# Patient Record
Sex: FEMALE | Race: WHITE | Employment: FULL TIME | URBAN - METROPOLITAN AREA
[De-identification: names, ages, dates, MRNs, and addresses within clinical notes are randomized per-mention and may not be internally consistent; named-entity substitution may affect disease eponyms.]

---

## 2017-12-01 PROBLEM — Z00.00 HEALTH CARE MAINTENANCE: Status: ACTIVE | Noted: 2017-12-01

## 2017-12-01 PROBLEM — E78.5 HYPERLIPIDEMIA: Status: ACTIVE | Noted: 2017-12-01

## 2017-12-01 PROBLEM — R01.1 MURMUR, CARDIAC: Status: ACTIVE | Noted: 2017-12-01

## 2017-12-01 PROBLEM — R00.2 PALPITATIONS: Status: ACTIVE | Noted: 2017-12-01

## 2018-03-06 PROBLEM — R00.0 TACHYCARDIA: Status: ACTIVE | Noted: 2018-03-06

## 2018-04-12 PROBLEM — Z00.00 HEALTH CARE MAINTENANCE: Status: RESOLVED | Noted: 2017-12-01 | Resolved: 2018-04-12

## 2019-04-30 PROBLEM — I34.1 MVP (MITRAL VALVE PROLAPSE): Status: ACTIVE | Noted: 2019-04-30

## 2019-04-30 PROBLEM — I34.0 NON-RHEUMATIC MITRAL REGURGITATION: Status: ACTIVE | Noted: 2019-04-30

## 2023-04-18 PROBLEM — B37.31 CANDIDIASIS OF VAGINA: Status: ACTIVE | Noted: 2022-04-07

## 2023-04-18 PROBLEM — G44.229 CHRONIC TENSION-TYPE HEADACHE: Status: ACTIVE | Noted: 2022-04-07

## 2023-04-18 PROBLEM — J00 NASOPHARYNGITIS: Status: ACTIVE | Noted: 2023-03-22

## 2023-04-18 PROBLEM — J30.9 ALLERGIC RHINITIS: Status: ACTIVE | Noted: 2023-03-22

## 2023-07-14 ENCOUNTER — ANESTHESIA EVENT (OUTPATIENT)
Dept: OPERATING ROOM | Age: 48
End: 2023-07-14
Payer: COMMERCIAL

## 2023-07-14 RX ORDER — SODIUM CHLORIDE 9 MG/ML
INJECTION, SOLUTION INTRAVENOUS PRN
Status: CANCELLED | OUTPATIENT
Start: 2023-07-14

## 2023-07-17 ENCOUNTER — ANESTHESIA (OUTPATIENT)
Dept: OPERATING ROOM | Age: 48
End: 2023-07-17
Payer: COMMERCIAL

## 2023-07-17 ENCOUNTER — HOSPITAL ENCOUNTER (OUTPATIENT)
Age: 48
Setting detail: OUTPATIENT SURGERY
Discharge: HOME | End: 2023-07-17
Attending: SURGERY | Admitting: SURGERY
Payer: COMMERCIAL

## 2023-07-17 VITALS
OXYGEN SATURATION: 100 % | BODY MASS INDEX: 20.34 KG/M2 | TEMPERATURE: 97.2 F | RESPIRATION RATE: 17 BRPM | DIASTOLIC BLOOD PRESSURE: 69 MMHG | HEART RATE: 73 BPM | WEIGHT: 134.2 LBS | HEIGHT: 68 IN | SYSTOLIC BLOOD PRESSURE: 100 MMHG

## 2023-07-17 LAB — HCG, PREGNANCY URINE (POC): NEGATIVE

## 2023-07-17 PROCEDURE — 2709999900 HC NON-CHARGEABLE SUPPLY: Performed by: SURGERY

## 2023-07-17 PROCEDURE — 6370000000 HC RX 637 (ALT 250 FOR IP): Performed by: SURGERY

## 2023-07-17 PROCEDURE — 2580000003 HC RX 258: Performed by: ANESTHESIOLOGY

## 2023-07-17 PROCEDURE — 2500000003 HC RX 250 WO HCPCS

## 2023-07-17 PROCEDURE — 81025 URINE PREGNANCY TEST: CPT

## 2023-07-17 PROCEDURE — 7100000010 HC PHASE II RECOVERY - FIRST 15 MIN: Performed by: SURGERY

## 2023-07-17 PROCEDURE — 6360000002 HC RX W HCPCS

## 2023-07-17 PROCEDURE — 7100000011 HC PHASE II RECOVERY - ADDTL 15 MIN: Performed by: SURGERY

## 2023-07-17 PROCEDURE — 3700000000 HC ANESTHESIA ATTENDED CARE: Performed by: SURGERY

## 2023-07-17 PROCEDURE — 3700000001 HC ADD 15 MINUTES (ANESTHESIA): Performed by: SURGERY

## 2023-07-17 PROCEDURE — 3609027000 HC COLONOSCOPY: Performed by: SURGERY

## 2023-07-17 RX ORDER — PROPOFOL 10 MG/ML
INJECTION, EMULSION INTRAVENOUS PRN
Status: DISCONTINUED | OUTPATIENT
Start: 2023-07-17 | End: 2023-07-17 | Stop reason: SDUPTHER

## 2023-07-17 RX ORDER — SODIUM CHLORIDE 0.9 % (FLUSH) 0.9 %
5-40 SYRINGE (ML) INJECTION EVERY 12 HOURS SCHEDULED
Status: DISCONTINUED | OUTPATIENT
Start: 2023-07-17 | End: 2023-07-17 | Stop reason: HOSPADM

## 2023-07-17 RX ORDER — PHENYLEPHRINE HCL IN 0.9% NACL 1 MG/10 ML
SYRINGE (ML) INTRAVENOUS PRN
Status: DISCONTINUED | OUTPATIENT
Start: 2023-07-17 | End: 2023-07-17 | Stop reason: SDUPTHER

## 2023-07-17 RX ORDER — SODIUM CHLORIDE 0.9 % (FLUSH) 0.9 %
5-40 SYRINGE (ML) INJECTION PRN
Status: DISCONTINUED | OUTPATIENT
Start: 2023-07-17 | End: 2023-07-17 | Stop reason: HOSPADM

## 2023-07-17 RX ORDER — LIDOCAINE HYDROCHLORIDE 10 MG/ML
INJECTION, SOLUTION INFILTRATION; PERINEURAL PRN
Status: DISCONTINUED | OUTPATIENT
Start: 2023-07-17 | End: 2023-07-17 | Stop reason: SDUPTHER

## 2023-07-17 RX ORDER — SODIUM CHLORIDE, SODIUM LACTATE, POTASSIUM CHLORIDE, CALCIUM CHLORIDE 600; 310; 30; 20 MG/100ML; MG/100ML; MG/100ML; MG/100ML
INJECTION, SOLUTION INTRAVENOUS CONTINUOUS
Status: DISCONTINUED | OUTPATIENT
Start: 2023-07-17 | End: 2023-07-17 | Stop reason: HOSPADM

## 2023-07-17 RX ORDER — SIMETHICONE 20 MG/.3ML
EMULSION ORAL PRN
Status: DISCONTINUED | OUTPATIENT
Start: 2023-07-17 | End: 2023-07-17 | Stop reason: ALTCHOICE

## 2023-07-17 RX ADMIN — PROPOFOL 50 MG: 10 INJECTION, EMULSION INTRAVENOUS at 09:25

## 2023-07-17 RX ADMIN — LIDOCAINE HYDROCHLORIDE 40 MG: 10 INJECTION, SOLUTION INFILTRATION; PERINEURAL at 09:15

## 2023-07-17 RX ADMIN — PROPOFOL 80 MG: 10 INJECTION, EMULSION INTRAVENOUS at 09:20

## 2023-07-17 RX ADMIN — Medication 100 MCG: at 09:26

## 2023-07-17 RX ADMIN — PROPOFOL 50 MG: 10 INJECTION, EMULSION INTRAVENOUS at 09:35

## 2023-07-17 RX ADMIN — PROPOFOL 70 MG: 10 INJECTION, EMULSION INTRAVENOUS at 09:16

## 2023-07-17 RX ADMIN — SODIUM CHLORIDE, POTASSIUM CHLORIDE, SODIUM LACTATE AND CALCIUM CHLORIDE: 600; 310; 30; 20 INJECTION, SOLUTION INTRAVENOUS at 08:30

## 2023-07-17 RX ADMIN — PROPOFOL 175 MCG/KG/MIN: 10 INJECTION, EMULSION INTRAVENOUS at 09:18

## 2023-07-17 RX ADMIN — PROPOFOL 150 MCG/KG/MIN: 10 INJECTION, EMULSION INTRAVENOUS at 09:15

## 2023-07-17 ASSESSMENT — PAIN - FUNCTIONAL ASSESSMENT: PAIN_FUNCTIONAL_ASSESSMENT: NONE - DENIES PAIN

## 2023-07-17 NOTE — ANESTHESIA POSTPROCEDURE EVALUATION
Department of Anesthesiology  Postprocedure Note    Patient: Jacob Akers  MRN: [id number]  [address] [facility] 1975  Date of evaluation: 7/17/2023      Procedure Summary     Date: 07/17/23 Room / Location: [address]    Anesthesia Start: 9003 Anesthesia Stop: 8057    Procedure: COLORECTAL CANCER SCREENING, NOT HIGH RISK (Anus) Diagnosis:       Screening for colon cancer      (Screening for colon cancer [Z12.11])    Surgeons: Thuan Mejia DO Responsible Provider: Barry Scott MD    Anesthesia Type: general, MAC ASA Status: 1          Anesthesia Type: No value filed.     Brie Phase I: Brie Score: 10    Brie Phase II: Brie Score: 10      Anesthesia Post Evaluation    Patient location during evaluation: bedside  Patient participation: complete - patient participated  Level of consciousness: awake  Airway patency: patent  Nausea & Vomiting: no nausea and no vomiting  Complications: no  Cardiovascular status: blood pressure returned to baseline  Respiratory status: acceptable  Hydration status: euvolemic

## 2023-07-17 NOTE — BRIEF OP NOTE
Brief Postoperative Note      Patient: Jude Singletary  YOB: 1975  MRN: [id number]  DO [facility]      Date of Procedure: 7/17/2023    Pre-Op Diagnosis Codes:     * Screening for colon cancer [Z12.11]    Post-Op Diagnosis:  normal colon       Procedure:  Colonoscopy to cecum      Surgeon(s):  Lea Ivan DO    Anesthesia: Monitor Anesthesia Care    Estimated Blood Loss (mL): none    Complications: None    Specimens:   * No specimens in log *      Electronically signed by Lea Ivan DO, FACOS, DU on 7/17/2023 at 12:39 PM

## 2023-07-17 NOTE — OP NOTE
Operative Note      Patient: Sukhwinder Perez  YOB: 1975  MRN: [id number]  DO [facility]      Date of Procedure: 7/17/2023    Pre-Op Diagnosis Codes:     * Screening for colon cancer [Z12.11]    Post-Op Diagnosis: normal colon       Procedure:  Colonoscopy to cecum      Surgeon(s):  Weston Collado DO    Anesthesia: Monitor Anesthesia Care    Estimated Blood Loss (mL): none    Complications: None    Specimens:   * No specimens in log *    Procedure details:    I do meet with the patient in preoperative holding area. Please see H&P for indications for the above named procedure. Patient was brought to the endoscopy suite and placed under monitored anesthesia. Patient was positioned in left lateral position. Time out called and procedure confirmed. Rectal examination performed. The lubricated variable stiffness pediatric colonoscope was carefully passed under direct vision into the rectum, advanced through the sigmoid colon, transverse colon, ascending colon and to the cecum. Findings:     Cecum: normal cecal pouch. IC valve and appendiceal orifice well confirmed   Ascending: normal  Transverse: normal  Descending: normal  Sigmoid: normal  Rectum: normal  Rectal exam: negative  Bowel prep quality: excellent     At the distal 1/3 of the rectum, the scope was retroflexed and was negative. The patient tolerated the procedure well. The abdomen was soft after the procedure. I spoke with  afterwards.      Next colonoscopy for screening: 10 years      Electronically signed by Weston Collado DO, FACOS, FACS on 7/17/2023 at 12:39 PM

## 2023-07-17 NOTE — H&P
GENERAL SURGERY CONSULTATION        Patient's Name/ Date of Birth/ Gender: Marylene Edouard / 1975 (41 y.o.) / female      PCP: Elizabeth Avitia DO  Referring:      History of present Illness:  Patient is a pleasant 41 y.o. female  kindly referred by Elizabeth Avitia DO  She is here with her . Helathy thin female non-smoker, denies melena or hematochezia, denies first degree relatives with colorectal cancer or inflammatory bowel disease. No unexplained weight loss, no significant changes in bowel habits     Past Medical History:  has a past medical history of Hyperlipidemia, Palpitations, PVC (premature ventricular contraction), and Tachycardia. Past Surgical History:   Past Surgical History   History reviewed. No pertinent surgical history. Social History:  reports that she has never smoked. She has never used smokeless tobacco. She reports current alcohol use. She reports that she does not use drugs. Family History: family history includes Diabetes in her mother; Hypertension in her mother; Stroke in her father.      Review of Systems:   General: Completed and, except as mentioned above, was negative or noncontributory  Psychological:  Completed and, except as mentioned above, was negative or noncontributory  Ophthalmic:  Completed and, except as mentioned above, was negative or noncontributory  ENT:  Completed and, except as mentioned above, was negative or noncontributory  Allergy and Immunology:  Completed and, except as mentioned above, was negative or noncontributory  Hematological and Lymphatic:  Completed and, except as mentioned above, was negative or noncontributory  Endocrine: Completed and, except as mentioned above, was negative or noncontributory  Breast:  Completed and, except as mentioned above, was negative or noncontributory  Respiratory:  Completed and, except as mentioned above, was negative or noncontributory  Cardiovascular:  Completed and, except as mentioned

## 2023-10-23 ENCOUNTER — HOSPITAL ENCOUNTER (OUTPATIENT)
Dept: ULTRASOUND IMAGING | Age: 48
Discharge: HOME | End: 2023-10-25
Attending: FAMILY MEDICINE
Payer: COMMERCIAL

## 2023-10-23 DIAGNOSIS — R22.1 LOCALIZED SWELLING, MASS AND LUMP, NECK: ICD-10-CM

## 2023-10-23 PROCEDURE — 76536 US EXAM OF HEAD AND NECK: CPT

## (undated) DEVICE — GLOVE ORTHO 7   MSG9470

## (undated) DEVICE — ADAPTER CLEANING PORPOISE CLEANING

## (undated) DEVICE — Device: Brand: DEFENDO VALVE AND CONNECTOR KIT

## (undated) DEVICE — PERRYSBURG ENDO PACK: Brand: MEDLINE INDUSTRIES, INC.